# Patient Record
Sex: FEMALE | Race: WHITE | NOT HISPANIC OR LATINO
[De-identification: names, ages, dates, MRNs, and addresses within clinical notes are randomized per-mention and may not be internally consistent; named-entity substitution may affect disease eponyms.]

---

## 2023-02-23 PROBLEM — Z00.00 ENCOUNTER FOR PREVENTIVE HEALTH EXAMINATION: Status: ACTIVE | Noted: 2023-02-23

## 2023-02-27 ENCOUNTER — NON-APPOINTMENT (OUTPATIENT)
Age: 58
End: 2023-02-27

## 2023-02-28 ENCOUNTER — APPOINTMENT (OUTPATIENT)
Dept: PLASTIC SURGERY | Facility: CLINIC | Age: 58
End: 2023-02-28

## 2023-03-08 ENCOUNTER — TRANSCRIPTION ENCOUNTER (OUTPATIENT)
Age: 58
End: 2023-03-08

## 2023-03-08 VITALS
TEMPERATURE: 99 F | OXYGEN SATURATION: 97 % | WEIGHT: 237 LBS | HEART RATE: 69 BPM | HEIGHT: 65 IN | DIASTOLIC BLOOD PRESSURE: 84 MMHG | SYSTOLIC BLOOD PRESSURE: 134 MMHG | RESPIRATION RATE: 18 BRPM

## 2023-03-08 NOTE — PATIENT PROFILE ADULT - STATED REASON FOR ADMISSION
B/L mastectomy simple complete B/L nipple skin sparing mastectomy, sentinel node dissection deep, injection blue dye B/L nuclear medicine , navigation, faxitron

## 2023-03-09 ENCOUNTER — OUTPATIENT (OUTPATIENT)
Dept: OUTPATIENT SERVICES | Facility: HOSPITAL | Age: 58
LOS: 1 days | End: 2023-03-09
Payer: COMMERCIAL

## 2023-03-09 ENCOUNTER — APPOINTMENT (OUTPATIENT)
Dept: NUCLEAR MEDICINE | Facility: HOSPITAL | Age: 58
End: 2023-03-09

## 2023-03-09 ENCOUNTER — TRANSCRIPTION ENCOUNTER (OUTPATIENT)
Age: 58
End: 2023-03-09

## 2023-03-09 ENCOUNTER — INPATIENT (INPATIENT)
Facility: HOSPITAL | Age: 58
LOS: 1 days | Discharge: HOME HEALTH | DRG: 580 | End: 2023-03-11
Attending: SURGERY | Admitting: SURGERY
Payer: COMMERCIAL

## 2023-03-09 DIAGNOSIS — Z98.890 OTHER SPECIFIED POSTPROCEDURAL STATES: Chronic | ICD-10-CM

## 2023-03-09 DIAGNOSIS — C50.912 MALIGNANT NEOPLASM OF UNSPECIFIED SITE OF LEFT FEMALE BREAST: ICD-10-CM

## 2023-03-09 DIAGNOSIS — C79.2 SECONDARY MALIGNANT NEOPLASM OF SKIN: ICD-10-CM

## 2023-03-09 DIAGNOSIS — R42 DIZZINESS AND GIDDINESS: ICD-10-CM

## 2023-03-09 DIAGNOSIS — E66.01 MORBID (SEVERE) OBESITY DUE TO EXCESS CALORIES: ICD-10-CM

## 2023-03-09 DIAGNOSIS — C77.3 SECONDARY AND UNSPECIFIED MALIGNANT NEOPLASM OF AXILLA AND UPPER LIMB LYMPH NODES: ICD-10-CM

## 2023-03-09 DIAGNOSIS — C50.911 MALIGNANT NEOPLASM OF UNSPECIFIED SITE OF RIGHT FEMALE BREAST: ICD-10-CM

## 2023-03-09 DIAGNOSIS — Z90.49 ACQUIRED ABSENCE OF OTHER SPECIFIED PARTS OF DIGESTIVE TRACT: Chronic | ICD-10-CM

## 2023-03-09 PROCEDURE — 88307 TISSUE EXAM BY PATHOLOGIST: CPT | Mod: 26

## 2023-03-09 PROCEDURE — 78195 LYMPH SYSTEM IMAGING: CPT | Mod: 26

## 2023-03-09 PROCEDURE — 88341 IMHCHEM/IMCYTCHM EA ADD ANTB: CPT | Mod: 26,59

## 2023-03-09 PROCEDURE — 88360 TUMOR IMMUNOHISTOCHEM/MANUAL: CPT | Mod: 26

## 2023-03-09 PROCEDURE — 76098 X-RAY EXAM SURGICAL SPECIMEN: CPT | Mod: 26

## 2023-03-09 PROCEDURE — A9541: CPT

## 2023-03-09 PROCEDURE — 78195 LYMPH SYSTEM IMAGING: CPT

## 2023-03-09 PROCEDURE — 88342 IMHCHEM/IMCYTCHM 1ST ANTB: CPT | Mod: 26,59

## 2023-03-09 DEVICE — IMPLANTABLE DEVICE: Type: IMPLANTABLE DEVICE | Site: BILATERAL | Status: FUNCTIONAL

## 2023-03-09 DEVICE — CLIP APPLIER ETHICON LIGACLIP 11.5" MEDIUM: Type: IMPLANTABLE DEVICE | Site: BILATERAL | Status: FUNCTIONAL

## 2023-03-09 DEVICE — CLIP APPLIER ETHICON LIGACLIP 9 3/8" SMALL: Type: IMPLANTABLE DEVICE | Site: BILATERAL | Status: FUNCTIONAL

## 2023-03-09 RX ORDER — OXYCODONE HYDROCHLORIDE 5 MG/1
5 TABLET ORAL EVERY 6 HOURS
Refills: 0 | Status: DISCONTINUED | OUTPATIENT
Start: 2023-03-09 | End: 2023-03-11

## 2023-03-09 RX ORDER — OXYCODONE HYDROCHLORIDE 5 MG/1
10 TABLET ORAL EVERY 6 HOURS
Refills: 0 | Status: DISCONTINUED | OUTPATIENT
Start: 2023-03-09 | End: 2023-03-11

## 2023-03-09 RX ORDER — CEFAZOLIN SODIUM 1 G
1000 VIAL (EA) INJECTION EVERY 8 HOURS
Refills: 0 | Status: DISCONTINUED | OUTPATIENT
Start: 2023-03-09 | End: 2023-03-11

## 2023-03-09 RX ORDER — ONDANSETRON 8 MG/1
4 TABLET, FILM COATED ORAL EVERY 6 HOURS
Refills: 0 | Status: DISCONTINUED | OUTPATIENT
Start: 2023-03-09 | End: 2023-03-11

## 2023-03-09 RX ORDER — DIAZEPAM 5 MG
5 TABLET ORAL EVERY 6 HOURS
Refills: 0 | Status: DISCONTINUED | OUTPATIENT
Start: 2023-03-09 | End: 2023-03-09

## 2023-03-09 RX ORDER — SODIUM CHLORIDE 9 MG/ML
1000 INJECTION, SOLUTION INTRAVENOUS
Refills: 0 | Status: DISCONTINUED | OUTPATIENT
Start: 2023-03-09 | End: 2023-03-10

## 2023-03-09 RX ORDER — ACETAMINOPHEN 500 MG
1000 TABLET ORAL EVERY 6 HOURS
Refills: 0 | Status: DISCONTINUED | OUTPATIENT
Start: 2023-03-09 | End: 2023-03-11

## 2023-03-09 RX ORDER — HYDROMORPHONE HYDROCHLORIDE 2 MG/ML
0.25 INJECTION INTRAMUSCULAR; INTRAVENOUS; SUBCUTANEOUS
Refills: 0 | Status: DISCONTINUED | OUTPATIENT
Start: 2023-03-09 | End: 2023-03-09

## 2023-03-09 RX ORDER — DIAZEPAM 5 MG
5 TABLET ORAL EVERY 6 HOURS
Refills: 0 | Status: DISCONTINUED | OUTPATIENT
Start: 2023-03-09 | End: 2023-03-11

## 2023-03-09 RX ADMIN — HYDROMORPHONE HYDROCHLORIDE 0.25 MILLIGRAM(S): 2 INJECTION INTRAMUSCULAR; INTRAVENOUS; SUBCUTANEOUS at 20:15

## 2023-03-09 RX ADMIN — Medication 100 MILLIGRAM(S): at 22:56

## 2023-03-09 RX ADMIN — HYDROMORPHONE HYDROCHLORIDE 0.25 MILLIGRAM(S): 2 INJECTION INTRAMUSCULAR; INTRAVENOUS; SUBCUTANEOUS at 20:30

## 2023-03-09 RX ADMIN — HYDROMORPHONE HYDROCHLORIDE 0.25 MILLIGRAM(S): 2 INJECTION INTRAMUSCULAR; INTRAVENOUS; SUBCUTANEOUS at 18:41

## 2023-03-09 RX ADMIN — HYDROMORPHONE HYDROCHLORIDE 0.25 MILLIGRAM(S): 2 INJECTION INTRAMUSCULAR; INTRAVENOUS; SUBCUTANEOUS at 18:55

## 2023-03-09 NOTE — H&P ADULT - ASSESSMENT
Patient is a 58yo female with PMHx of vertigo, morbid obesity (BMI 39), b/l invasive intraductal carcinoma, and PSHx of a laparoscopic cholecystectomy (2022), who presents to [facility] for an elective b/l simple mastectomy with b/l sentinel lymph node dissection, and oncoplastic reconstruction with alloderm implants. Patient admitted post-operatively.    Plan    Admit to [facility] - Dr. Polo  Pain/Nausea control  Regular diet  L JPx2, R JPx1  SCD, holding [facility] labs Patient is a 58yo female with PMHx of vertigo, morbid obesity (BMI 39), b/l breast Ca, and PSHx of a laparoscopic cholecystectomy (2022), who presents to [facility] for an elective b/l simple mastectomy with b/l sentinel lymph node dissection, and  reconstruction with implants and alloderm placement.      Plan  Admit to [facility] - Dr. Polo  Pain/Nausea control  Regular diet  Ancef until discharge  L JPx2, R JPx1  SCD, holding [facility]  AM labs

## 2023-03-09 NOTE — BRIEF OPERATIVE NOTE - NSICDXBRIEFPREOP_GEN_ALL_CORE_FT
PRE-OP DIAGNOSIS:  Invasive ductal carcinoma of breast 09-Mar-2023 17:39:05  Castillo Flores   PRE-OP DIAGNOSIS:  Invasive lobular carcinoma of left breast, stage 2 09-Mar-2023 20:55:39 T2N0 ER+, AR+, Her2- Castillo Flores  Invasive ductal carcinoma of right breast, stage 2 09-Mar-2023 20:55:56 T2N0 ER+, AR+. Her2- Castillo Flores

## 2023-03-09 NOTE — PRE-ANESTHESIA EVALUATION ADULT - NSPROPOSEDPROCEDFT_GEN_ALL_CORE
bilateral mastectomy simple complete, SLND, bilateral breast reconstruction with implants
b/l mastectomy with reconstruction

## 2023-03-09 NOTE — PRE-ANESTHESIA EVALUATION ADULT - NSANTHPMHFT_GEN_ALL_CORE
had a cold 10 days ago, consistent cough had a cold 10 days ago, consistent cough  GERD - Increases when not eating and she is experiencing symptoms today.  (did not take meds last night or today)

## 2023-03-09 NOTE — BRIEF OPERATIVE NOTE - NSICDXBRIEFPOSTOP_GEN_ALL_CORE_FT
POST-OP DIAGNOSIS:  Invasive ductal carcinoma of breast 09-Mar-2023 17:39:19  Castillo Flores   POST-OP DIAGNOSIS:  Invasive ductal carcinoma of right breast, stage 2 09-Mar-2023 20:56:52  Castillo Flores  Invasive lobular carcinoma of left breast, stage 2 09-Mar-2023 20:57:04  Castillo Flores

## 2023-03-09 NOTE — H&P ADULT - NSHPPHYSICALEXAM_GEN_ALL_CORE
CONSTITUTIONAL: Well groomed, no apparent distress  EYES: PERRLA and symmetric, EOMI, No conjunctival or scleral injection, non-icteric  ENMT: Oral mucosa with moist membranes.   NECK: Supple, symmetric and without tracheal deviation  BREAST: equal b/l, palpable masses: R upper outer quadrant, L retroareolar lower inner   RESP: No respiratory distress, no use of accessory muscles  CV: No JVD; no peripheral edema  GI: Soft, NT, ND, no rebound, no guarding  SKIN: No rashes or ulcers noted; no subcutaneous nodules or induration palpable  NEURO: No apparent focal deficits   PSYCH: Appropriate insight/judgment; A+O x 3, mood and affect appropriate, recent/remote memory intact

## 2023-03-09 NOTE — BRIEF OPERATIVE NOTE - NSICDXBRIEFPROCEDURE_GEN_ALL_CORE_FT
PROCEDURES:  Total mastectomy with sentinel lymph node biopsy 09-Mar-2023 17:32:55  Castillo Flores

## 2023-03-09 NOTE — H&P ADULT - HISTORY OF PRESENT ILLNESS
Patient is a 56yo female with PMHx of vertigo, morbid obesity (BMI 39), b/l invasive intraductal carcinoma, and PSHx of a laparoscopic cholecystectomy (2022), who presents to [facility] for an elective b/l simple mastectomy with b/l sentinel lymph node dissection, and oncoplastic reconstruction with alloderm implants. Patient feels well with no active complaints. Patient is a 58yo female with PMHx of vertigo, morbid obesity (BMI 39), b/l invasive intraductal carcinoma, and PSHx of a laparoscopic cholecystectomy (2022), who presents to [facility] for an elective b/l simple mastectomy with b/l sentinel lymph node dissection, and  reconstruction with implants and alloderm placement. Patient feels well with no active complaints. Patient is a 58yo female with PMHx of vertigo, morbid obesity (BMI 39), b/l invasive intraductal carcinoma (T2N1M0/Stage IIB), and PSHx of a laparoscopic cholecystectomy (2022), who presents to [facility] for an elective b/l simple mastectomy with b/l sentinel lymph node dissection, and  reconstruction with implants and alloderm placement.  Patient feels well with no active complaints. Patient is a 56yo female with PMHx of vertigo, morbid obesity (BMI 39), b/l breast Ca, and PSHx of a laparoscopic cholecystectomy (2022), who presents to [facility] for an elective b/l simple mastectomy with b/l sentinel lymph node dissection, and  reconstruction with implants and alloderm placement.  Preoperative workup for b/l breast Ca was as follows: R sided IDC stage IIa (T2N0 ER+, PA+, Her2-), L sided ILC Stage IIa (T2N0 ER+, PA+, Her2-). She underwent preoperative risk stratification and was noted to be low risk for low//intermediate risk procedure.  Patient feels well with no active complaints.

## 2023-03-09 NOTE — BRIEF OPERATIVE NOTE - OPERATION/FINDINGS
Dye injected prior to incision. Skin prepped and draped with maximal sterile technique. Elliptical incision made around areola. Flaps raised superiorly to clavicle, medially to sternum, inferiorly to IMF, laterally to border of latissimus dorsi, and posteriorly to pectoralis major fascia using electrocautery and sharp dissection. Bleeding perforators clipped 2x on L, 1x on R. Dissection made to sentinel lymph nodes and 3x specimens collected b/l. Operative area flushed with sterile saline and hemostasis confirmed. Cavities packed with wet lap pads. Plastics continued OR for reconstruction. Dye injected prior to incision. Skin prepped and draped with maximal sterile technique. Elliptical incision made around areola. Flaps raised superiorly to clavicle, medially to sternum, inferiorly to IMF, laterally to border of latissimus dorsi, and posteriorly to pectoralis major fascia using electrocautery and sharp dissection. Multiple small perforaters encountered and cauterized. Large bleeding perforators clipped 2x on L, 1x on R.  Dissection made to sentinel lymph nodes and 3x specimens collected b/l. Operative area flushed with sterile saline and hemostasis confirmed. Cavities packed with wet lap pads. Plastics continued OR for reconstruction.

## 2023-03-09 NOTE — PRE-ANESTHESIA EVALUATION ADULT - NSANTHPEFT_GEN_ALL_CORE
General: AAOx3, NAD  Eyes: The sclera and conjunctiva normal, pupils equal in size.  ENT: The ears and nose were normal in appearance; oropharynx clear, moist mucus membranes.  Neck: The appearance of the neck was normal, with no gross masses or nodules.  Respiratory: Unlabored, no retractions. pos cough -  CTA b/l no w/r/r   Neurological: No focal deficit, moves all extremities.  Exercise Tolerance:  METS>4.

## 2023-03-10 ENCOUNTER — TRANSCRIPTION ENCOUNTER (OUTPATIENT)
Age: 58
End: 2023-03-10

## 2023-03-10 RX ORDER — ACETAMINOPHEN 500 MG
2 TABLET ORAL
Qty: 0 | Refills: 0 | DISCHARGE
Start: 2023-03-10

## 2023-03-10 RX ORDER — IPRATROPIUM/ALBUTEROL SULFATE 18-103MCG
3 AEROSOL WITH ADAPTER (GRAM) INHALATION EVERY 6 HOURS
Refills: 0 | Status: DISCONTINUED | OUTPATIENT
Start: 2023-03-10 | End: 2023-03-11

## 2023-03-10 RX ORDER — PANTOPRAZOLE SODIUM 20 MG/1
40 TABLET, DELAYED RELEASE ORAL
Refills: 0 | Status: DISCONTINUED | OUTPATIENT
Start: 2023-03-10 | End: 2023-03-11

## 2023-03-10 RX ADMIN — PANTOPRAZOLE SODIUM 40 MILLIGRAM(S): 20 TABLET, DELAYED RELEASE ORAL at 01:38

## 2023-03-10 RX ADMIN — OXYCODONE HYDROCHLORIDE 10 MILLIGRAM(S): 5 TABLET ORAL at 11:05

## 2023-03-10 RX ADMIN — OXYCODONE HYDROCHLORIDE 10 MILLIGRAM(S): 5 TABLET ORAL at 16:35

## 2023-03-10 RX ADMIN — OXYCODONE HYDROCHLORIDE 5 MILLIGRAM(S): 5 TABLET ORAL at 01:36

## 2023-03-10 RX ADMIN — Medication 100 MILLIGRAM(S): at 21:48

## 2023-03-10 RX ADMIN — OXYCODONE HYDROCHLORIDE 10 MILLIGRAM(S): 5 TABLET ORAL at 10:35

## 2023-03-10 RX ADMIN — Medication 1000 MILLIGRAM(S): at 06:19

## 2023-03-10 RX ADMIN — OXYCODONE HYDROCHLORIDE 5 MILLIGRAM(S): 5 TABLET ORAL at 00:36

## 2023-03-10 RX ADMIN — Medication 100 MILLIGRAM(S): at 06:11

## 2023-03-10 RX ADMIN — Medication 100 MILLIGRAM(S): at 15:21

## 2023-03-10 RX ADMIN — OXYCODONE HYDROCHLORIDE 10 MILLIGRAM(S): 5 TABLET ORAL at 17:05

## 2023-03-10 RX ADMIN — SODIUM CHLORIDE 140 MILLILITER(S): 9 INJECTION, SOLUTION INTRAVENOUS at 02:46

## 2023-03-10 NOTE — PROGRESS NOTE ADULT - SUBJECTIVE AND OBJECTIVE BOX
Procedure: b/l simple mastectomy with b/l SLN dissection and oncoplastic reconstruction with alloderm implants  Surgeon: Dr. Polo    S: Pt seen and examined at bedside. Patient is lying comfortably in bed with complaints of mild pain, right breast > left breast. She has only had a few bites and drinking some water, but has tolerated. Pain well controlled with current regimen. Patient denies fever, nausea, vomiting, chest pain, and shortness of breath. Patient is not yet passing gas or having bowel movements. Patient has not voided yet.     O:  T(C): --  T(F): --  HR: 77 (03-09-23 @ 21:00) (75 - 77)  BP: 133/72 (03-09-23 @ 21:00) (127/75 - 142/79)  RR: 15 (03-09-23 @ 21:00) (14 - 15)  SpO2: 96% (03-09-23 @ 21:00) (96% - 99%)  Wt(kg): --        Physical Exam  General: Patient is doing well and lying in bed comfortably  Constitutional: alert and oriented   Pulm: Nonlabored breathing, no respiratory distress  CV: Regular rate and rhythm, normal sinus rhythm  Breast: L - soft, nontender, incision c/d/i, no surrounding erythema, induration, drainage. R - soft, mildly tender to palpation near incisino, incision c/d/i, no surrounding erythema, induration, drainage.  Abd:  soft, nontender, nondistended. No rebound, no guarding.   Extremities: warm, well perfused, no edema  Drains: b/l SENTHIL ss    A/P: 57y Female s/p b/l simple mastectomy with b/l SLN dissection and oncoplastic reconstruction with alloderm implants  Reg Diet  pending TOV  Pain/nausea control prn  SCDs/OOBA/IS/holding [facility]  Dispo plan: Tele   Procedure: b/l simple mastectomy with b/l SLN dissection and oncoplastic reconstruction with alloderm implants  Surgeon: Dr. Polo    S: Pt seen and examined at bedside. Patient is lying comfortably in bed with complaints of mild pain, right breast > left breast. She has only had a few bites and drinking some water, but has tolerated. Pain well controlled with current regimen. Patient denies fever, nausea, vomiting, chest pain, and shortness of breath. Patient is not yet passing gas or having bowel movements. Patient has not voided yet.     O:  T(C): --  T(F): --  HR: 77 (03-09-23 @ 21:00) (75 - 77)  BP: 133/72 (03-09-23 @ 21:00) (127/75 - 142/79)  RR: 15 (03-09-23 @ 21:00) (14 - 15)  SpO2: 96% (03-09-23 @ 21:00) (96% - 99%)  Wt(kg): --        Physical Exam  General: Patient is doing well and lying in bed comfortably  Constitutional: alert and oriented   Pulm: Nonlabored breathing, no respiratory distress  CV: Regular rate and rhythm, normal sinus rhythm  Breast: L - soft, nontender, incision c/d/i, no surrounding erythema, induration, drainage. R - soft, mildly tender to palpation near incisino, incision c/d/i, no surrounding erythema, induration, drainage.  Abd:  soft, nontender, nondistended. No rebound, no guarding.   Extremities: warm, well perfused, no edema  Drains: SENTHIL x 3 all ss    A/P: 57y Female s/p b/l simple mastectomy with b/l SLN dissection and oncoplastic reconstruction with alloderm implants  Reg Diet  Ancef  pending TOV  Pain/nausea control prn  SCDs/OOBA/IS/holding [facility]  Dispo plan: Tele

## 2023-03-10 NOTE — DISCHARGE NOTE PROVIDER - HOSPITAL COURSE
Patient is a 58yo female with PMHx of vertigo, morbid obesity (BMI 39), b/l breast Ca, and PSHx of a laparoscopic cholecystectomy (2022), who presents to [facility] for an elective b/l simple mastectomy with b/l sentinel lymph node dissection, and  reconstruction with implants and alloderm placement.  Preoperative workup for b/l breast Ca was as follows: R sided IDC stage IIa (T2N0 ER+, SD+, Her2-), L sided ILC Stage IIa (T2N0 ER+, SD+, Her2-). She underwent preoperative risk stratification and was noted to be low risk for low//intermediate risk procedure.  Patient feels well with no active complaints. On 3/9 underwent b/l simple mastectomy w/b/l sentinel lymph node dissection and oncoplastic reconstruction with alloderm implaints. Tolerated procedure well with no immediate complicaitons. Postoperatively, patient experienced high levels of pain requiring prolonged hospital stay. Otherwise, patient tolerating diet, ambulating, voiding.    Incomplete 3/10 - please comment on pain control   Patient is a 56yo female with PMHx of vertigo, morbid obesity (BMI 39), b/l breast Ca, and PSHx of a laparoscopic cholecystectomy (2022), who presents to [facility] for an elective b/l simple mastectomy with b/l sentinel lymph node dissection, and  reconstruction with implants and alloderm placement.  Preoperative workup for b/l breast Ca was as follows: R sided IDC stage IIa (T2N0 ER+, OR+, Her2-), L sided ILC Stage IIa (T2N0 ER+, OR+, Her2-). She underwent preoperative risk stratification and was noted to be low risk for low//intermediate risk procedure.  Patient feels well with no active complaints. On 3/9 underwent b/l simple mastectomy w/b/l sentinel lymph node dissection and oncoplastic reconstruction with alloderm implaints. Tolerated procedure well with no immediate complicaitons. Postoperatively, patient experienced high levels of pain requiring prolonged hospital stay. Otherwise, patient tolerating diet, ambulating, voiding. Pain well controlled on 3/11 and patient was medically stable for discharge.

## 2023-03-10 NOTE — PROGRESS NOTE ADULT - SUBJECTIVE AND OBJECTIVE BOX
SUBJECTIVE:  Doing well.   No overnight events.     OBJECTIVE:     ** VITAL SIGNS / I&O's **    T(C): 37.2 (03-10-23 @ 05:15), Max: 37.2 (03-10-23 @ 05:15)  T(F): 98.9 (03-10-23 @ 05:15), Max: 98.9 (03-10-23 @ 05:15)  HR: 78 (03-10-23 @ 05:15) (69 - 87)  BP: 130/68 (03-10-23 @ 05:15) (110/64 - 148/72)  RR: 18 (03-10-23 @ 05:15) (14 - 23)  SpO2: 91% (03-10-23 @ 05:15) (91% - 99%)      09 Mar 2023 07:01  -  10 Mar 2023 07:00  --------------------------------------------------------  IN:    Lactated Ringers: 280 mL  Total IN: 280 mL    OUT:    Drain (mL): 25 mL    Drain (mL): 55 mL    Drain (mL): 60 mL    Drain (mL): 65 mL    Voided (mL): 575 mL  Total OUT: 780 mL    Total NET: -500 mL          ** PHYSICAL EXAM **     -- CONSTITUTIONAL: AOx3. NAD.   -- RIGHT BREAST: Mastectomy skin flap ecchymosis, stable. No collections. Drain(s) serosanguinous.  -- LEFT BREAST: Mastectomy skin flap ecchymosis, stable. No collections. Drain(s) serosanguinous.      ** LABS **                 CAPILLARY BLOOD GLUCOSE

## 2023-03-10 NOTE — DISCHARGE NOTE PROVIDER - NSDCCPCAREPLAN_GEN_ALL_CORE_FT
PRINCIPAL DISCHARGE DIAGNOSIS  Diagnosis: Status post surgery  Assessment and Plan of Treatment:

## 2023-03-10 NOTE — DISCHARGE NOTE PROVIDER - PROVIDER TOKENS
PROVIDER:[TOKEN:[[id number]],FOLLOWUP:[2 weeks]] PROVIDER:[TOKEN:[[id number]],FOLLOWUP:[2 weeks]],PROVIDER:[TOKEN:[[id number]]]

## 2023-03-10 NOTE — DISCHARGE NOTE PROVIDER - NSDCFUADDINST_GEN_ALL_CORE_FT
Please follow up with Dr. Polo in one week; you may call the office to make an appointment at your earliest convenience.    General Discharge Instructions:  Please resume all regular home medications unless specifically advised not to take a particular medication. Also, please take any new medications as prescribed.  Please get plenty of rest, continue to ambulate several times per day, and drink adequate amounts of fluids. Avoid lifting weights greater than 5-10 lbs until you follow-up with your surgeon, who will instruct you further regarding activity restrictions.  Avoid driving or operating heavy machinery while taking pain medications.  Please follow-up with your surgeon and Primary Care Provider (PCP) as advised.  *Please call your doctor if you have increased pain.    Warning Signs:  Please call your doctor if you experience the following:  *You experience new chest pain, pressure, squeezing or tightness.  *New or worsening cough, shortness of breath, or wheeze.  *If you are vomiting and cannot keep down fluids or your medications.  *You are getting dehydrated due to continued vomiting, diarrhea, or other reasons. Signs of dehydration include dry mouth, rapid heartbeat, or feeling dizzy or faint when standing.  *You see blood or dark/black material when you vomit or have a bowel movement.  *You experience burning when you urinate, have blood in your urine, or experience a discharge.  *Your pain is not improving within 8-12 hours or is not gone within 24 hours. Call or return immediately if your pain is getting worse, changes location, or moves to your chest or back.  *You have shaking chills, or fever greater than 101.5 degrees Fahrenheit or 38 degrees Celsius.  *Any change in your symptoms, or any new symptoms that concerns you.         Please follow up with Dr. Polo in one week; you may call the office to make an appointment at your earliest convenience.  Please follow up with Dr. Mckoy in one week; you may call the office to make an appointment at your earliest convenience.    OK to shower at home.    General Discharge Instructions:  Please resume all regular home medications unless specifically advised not to take a particular medication. Also, please take any new medications as prescribed.  Please get plenty of rest, continue to ambulate several times per day, and drink adequate amounts of fluids. Avoid lifting weights greater than 5-10 lbs until you follow-up with your surgeon, who will instruct you further regarding activity restrictions.  Avoid driving or operating heavy machinery while taking pain medications.  Please follow-up with your surgeon and Primary Care Provider (PCP) as advised.  *Please call your doctor if you have increased pain.    Warning Signs:  Please call your doctor if you experience the following:  *You experience new chest pain, pressure, squeezing or tightness.  *New or worsening cough, shortness of breath, or wheeze.  *If you are vomiting and cannot keep down fluids or your medications.  *You are getting dehydrated due to continued vomiting, diarrhea, or other reasons. Signs of dehydration include dry mouth, rapid heartbeat, or feeling dizzy or faint when standing.  *You see blood or dark/black material when you vomit or have a bowel movement.  *You experience burning when you urinate, have blood in your urine, or experience a discharge.  *Your pain is not improving within 8-12 hours or is not gone within 24 hours. Call or return immediately if your pain is getting worse, changes location, or moves to your chest or back.  *You have shaking chills, or fever greater than 101.5 degrees Fahrenheit or 38 degrees Celsius.  *Any change in your symptoms, or any new symptoms that concerns you.

## 2023-03-10 NOTE — DISCHARGE NOTE PROVIDER - NSDCCPTREATMENT_GEN_ALL_CORE_FT
PRINCIPAL PROCEDURE  Procedure: Total mastectomy with sentinel lymph node biopsy  Findings and Treatment:

## 2023-03-10 NOTE — DISCHARGE NOTE PROVIDER - CARE PROVIDER_API CALL
Price Polo (DO)  Surgery  [address], [place] [address]  Phone: [phone]  Fax: [phone]  Follow Up Time: 2 weeks   Price Polo (DO)  Surgery  [address], [place] [address]  Phone: [phone]  Fax: [phone]  Follow Up Time: 2 weeks    Alissa Mckoy)  Plastic Surgery  [address], [address]  [place] [address]  Phone: [phone]  Fax: [phone]  Follow Up Time:

## 2023-03-10 NOTE — PROGRESS NOTE ADULT - SUBJECTIVE AND OBJECTIVE BOX
Patient had some pain overnight but feels better today. She is eating and drinking but has not yet walked. She requiring supplemental oxygen overnight but it is no longer required.    AVSS on room air (sat 95%)  drains overnight R 25/65 L 60/55 - drains thin serosanguinous    Bilateral breasts soft, no hematoma appreciated    Patient doing well overall POD#1 s/p bilateral mastectomy, sentinel LB biopsy and immediate breast reconstruction with implants. Continue present management. I encouraged the patient to ambulate with assistance. Likely ok for discharge tomorrow.

## 2023-03-10 NOTE — PROGRESS NOTE ADULT - SUBJECTIVE AND OBJECTIVE BOX
STATUS POST:  Total mastectomy with sentinel lymph node biopsy        POST OPERATIVE DAY #: 1    SUBJECTIVE:   Patient seen and examined on am rounds. No new complaints. -n-v-cp-sob.    Vital Signs Last 24 Hrs  T(C): 37.4 (10 Mar 2023 09:10), Max: 37.4 (10 Mar 2023 09:10)  T(F): 99.4 (10 Mar 2023 09:10), Max: 99.4 (10 Mar 2023 09:10)  HR: 91 (10 Mar 2023 09:10) (69 - 91)  BP: 120/56 (10 Mar 2023 09:10) (110/64 - 148/72)  BP(mean): 97 (09 Mar 2023 21:00) (82 - 105)  RR: 18 (10 Mar 2023 09:10) (14 - 23)  SpO2: 91% (10 Mar 2023 05:15) (91% - 99%)    Parameters below as of 10 Mar 2023 05:15  Patient On (Oxygen Delivery Method): room air        I&O's Summary    09 Mar 2023 07:01  -  10 Mar 2023 07:00  --------------------------------------------------------  IN: 280 mL / OUT: 780 mL / NET: -500 mL        Physical Exam:  General Appearance: Appears well, NAD  Pulmonary: Nonlabored breathing, no respiratory distress  Cardiovascular: NSR  Abdomen: Soft, nondistended, nontender.  Breasts: no hematoma. JPx4 ss.  Extremities: WWP, SCD's in place     LABS:

## 2023-03-11 ENCOUNTER — TRANSCRIPTION ENCOUNTER (OUTPATIENT)
Age: 58
End: 2023-03-11

## 2023-03-11 VITALS
RESPIRATION RATE: 18 BRPM | DIASTOLIC BLOOD PRESSURE: 66 MMHG | OXYGEN SATURATION: 95 % | HEART RATE: 88 BPM | TEMPERATURE: 99 F | SYSTOLIC BLOOD PRESSURE: 132 MMHG

## 2023-03-11 PROCEDURE — 76098 X-RAY EXAM SURGICAL SPECIMEN: CPT

## 2023-03-11 PROCEDURE — 88341 IMHCHEM/IMCYTCHM EA ADD ANTB: CPT

## 2023-03-11 PROCEDURE — 88307 TISSUE EXAM BY PATHOLOGIST: CPT

## 2023-03-11 PROCEDURE — 88360 TUMOR IMMUNOHISTOCHEM/MANUAL: CPT

## 2023-03-11 PROCEDURE — C1889: CPT

## 2023-03-11 PROCEDURE — C1789: CPT

## 2023-03-11 RX ADMIN — Medication 1000 MILLIGRAM(S): at 06:12

## 2023-03-11 RX ADMIN — OXYCODONE HYDROCHLORIDE 10 MILLIGRAM(S): 5 TABLET ORAL at 09:10

## 2023-03-11 RX ADMIN — PANTOPRAZOLE SODIUM 40 MILLIGRAM(S): 20 TABLET, DELAYED RELEASE ORAL at 05:06

## 2023-03-11 RX ADMIN — OXYCODONE HYDROCHLORIDE 10 MILLIGRAM(S): 5 TABLET ORAL at 09:40

## 2023-03-11 RX ADMIN — OXYCODONE HYDROCHLORIDE 10 MILLIGRAM(S): 5 TABLET ORAL at 16:14

## 2023-03-11 RX ADMIN — OXYCODONE HYDROCHLORIDE 10 MILLIGRAM(S): 5 TABLET ORAL at 16:44

## 2023-03-11 RX ADMIN — OXYCODONE HYDROCHLORIDE 10 MILLIGRAM(S): 5 TABLET ORAL at 00:21

## 2023-03-11 RX ADMIN — OXYCODONE HYDROCHLORIDE 10 MILLIGRAM(S): 5 TABLET ORAL at 01:21

## 2023-03-11 RX ADMIN — Medication 1000 MILLIGRAM(S): at 05:12

## 2023-03-11 RX ADMIN — Medication 100 MILLIGRAM(S): at 05:06

## 2023-03-11 NOTE — PROGRESS NOTE ADULT - ASSESSMENT
Patient is a 56yo female with PMHx of vertigo, morbid obesity (BMI 39), b/l breast Ca, and PSHx of a laparoscopic cholecystectomy (2022), who is now s/p an elective b/l simple mastectomy with b/l sentinel lymph node dissection, and reconstruction with implants and alloderm placement    Plan:  Reg diet  Pain/Nausea control prn  L JPx2, R JPx2  SCD/OOBA/IS/holding [facility]  Ancef until D/C  likely ready for dc home today   
Patient is a 58yo female with PMHx of vertigo, morbid obesity (BMI 39), b/l breast Ca, and PSHx of a laparoscopic cholecystectomy (2022), who is now s/p an elective b/l simple mastectomy with b/l sentinel lymph node dissection, and reconstruction with implants and alloderm placement    Plan  Reg diet  Pain/Nausea control prn  L JPx2, R JPx2  SCD/OOBA/IS/holding [facility]  dispo: home
A/P: 57y female s/p bilateral mastectomy with direct to implant reconstruction, doing well    Patient likely to be discharged today per primary team   patient should ambulate   continue IV abx while in house  patient has prescriptions from office for d/c home     
Patient is a 56yo female with PMHx of vertigo, morbid obesity (BMI 39), b/l breast Ca, and PSHx of a laparoscopic cholecystectomy (2022), who is now s/p an elective b/l simple mastectomy with b/l sentinel lymph node dissection, and reconstruction with implants and alloderm placement    Plan  Reg diet  Pain/Nausea control prn  L JPx2, R JPx2  SCD/OOBA/IS/holding [facility]  Ancef until D/C

## 2023-03-11 NOTE — PROGRESS NOTE ADULT - SUBJECTIVE AND OBJECTIVE BOX
STATUS POST:  Total mastectomy with sentinel lymph node biopsy        POST OPERATIVE DAY #: 2    SUBJECTIVE:   Patient seen and examined on am rounds. No new complaints. -n-v-cp-sob.      T(C): 36.9 (03-11-23 @ 05:00), Max: 37.4 (03-10-23 @ 09:10)  T(F): 98.5 (03-11-23 @ 05:00), Max: 99.4 (03-10-23 @ 09:10)  HR: 92 (03-11-23 @ 05:00) (78 - 92)  BP: 124/64 (03-11-23 @ 05:00) (114/60 - 135/61)  RR: 16 (03-11-23 @ 05:00) (16 - 18)  SpO2: 93% (03-11-23 @ 05:00) (92% - 98%)      09 Mar 2023 07:01  -  10 Mar 2023 07:00  --------------------------------------------------------  IN:    Lactated Ringers: 280 mL  Total IN: 280 mL    OUT:    Drain (mL): 25 mL    Drain (mL): 55 mL    Drain (mL): 60 mL    Drain (mL): 65 mL    Voided (mL): 575 mL  Total OUT: 780 mL    Total NET: -500 mL      10 Mar 2023 07:01  -  11 Mar 2023 06:58  --------------------------------------------------------  IN:    Lactated Ringers: 1120 mL  Total IN: 1120 mL    OUT:    Drain (mL): 20 mL    Drain (mL): 70 mL    Drain (mL): 80 mL    Drain (mL): 160 mL    Voided (mL): 1800 mL  Total OUT: 2130 mL    Total NET: -1010 mL            Physical Exam:  General Appearance: Appears well, NAD  Pulmonary: Nonlabored breathing, no respiratory distress  Breasts: no collections, soft. JPx4 ss.  Extremities: WWP, SCD's in place

## 2023-03-11 NOTE — PROGRESS NOTE ADULT - REASON FOR ADMISSION
s/p b/l mastectomy
s/p b/l mastectomy and bilateral immediate implant reconstruction
s/p b/l mastectomy
s/p b/l mastectomy

## 2023-03-11 NOTE — DISCHARGE NOTE NURSING/CASE MANAGEMENT/SOCIAL WORK - PATIENT PORTAL LINK FT
You can access the FollowMyHealth Patient Portal offered by [facility] by registering at the following website: [email or web address]. By joining [employer]’s FollowMyHealth portal, you will also be able to view your health information using other applications (apps) compatible with our system.

## 2023-03-11 NOTE — PROGRESS NOTE ADULT - SUBJECTIVE AND OBJECTIVE BOX
STATUS POST:  Total mastectomy with sentinel lymph node biopsy    Total mastectomy with sentinel lymph node biopsy        POST OPERATIVE DAY #:     SUBJECTIVE:   Patient seen and examined on am rounds. Pain well controlled by medication. OOB. No new complaints. -n-v-cp-sob.    Vital Signs Last 24 Hrs  T(C): 36.9 (11 Mar 2023 05:00), Max: 37.3 (10 Mar 2023 20:48)  T(F): 98.5 (11 Mar 2023 05:00), Max: 99.1 (10 Mar 2023 20:48)  HR: 92 (11 Mar 2023 05:00) (78 - 92)  BP: 124/64 (11 Mar 2023 05:00) (114/60 - 135/61)  BP(mean): --  RR: 16 (11 Mar 2023 05:00) (16 - 18)  SpO2: 93% (11 Mar 2023 05:00) (92% - 98%)    Parameters below as of 11 Mar 2023 05:00  Patient On (Oxygen Delivery Method): room air        I&O's Summary    10 Mar 2023 07:01  -  11 Mar 2023 07:00  --------------------------------------------------------  IN: 1120 mL / OUT: 2130 mL / NET: -1010 mL        Physical Exam:  General Appearance: Appears well, NAD  Pulmonary: Nonlabored breathing, no respiratory distress  Cardiovascular: NSR  Abdomen: Soft, nondistended, nontender.  Breasts: no hematoma. JPx4 ss.  Extremities: WWP, SCD's in place     LABS:

## 2023-03-11 NOTE — DISCHARGE NOTE NURSING/CASE MANAGEMENT/SOCIAL WORK - NSDCPEFALRISK_GEN_ALL_CORE
Call your  Doctor  And  OB/GYNE  Doctor today for  appointment   For information on Fall & Injury Prevention, visit: [email or web address] OR  [email or web address] OR  https://www.cdc.gov/steadi/patient.html

## 2023-03-15 ENCOUNTER — TRANSCRIPTION ENCOUNTER (OUTPATIENT)
Age: 58
End: 2023-03-15

## 2023-03-15 LAB — SURGICAL PATHOLOGY STUDY: SIGNIFICANT CHANGE UP

## 2023-03-31 ENCOUNTER — TRANSCRIPTION ENCOUNTER (OUTPATIENT)
Age: 58
End: 2023-03-31

## 2023-10-09 PROBLEM — Z87.898 PERSONAL HISTORY OF OTHER SPECIFIED CONDITIONS: Chronic | Status: ACTIVE | Noted: 2023-03-08

## 2023-10-09 PROBLEM — C50.919 MALIGNANT NEOPLASM OF UNSPECIFIED SITE OF UNSPECIFIED FEMALE BREAST: Chronic | Status: ACTIVE | Noted: 2023-03-08

## 2023-10-11 ENCOUNTER — TRANSCRIPTION ENCOUNTER (OUTPATIENT)
Age: 58
End: 2023-10-11

## 2023-10-11 VITALS
TEMPERATURE: 98 F | HEIGHT: 65 IN | HEART RATE: 69 BPM | OXYGEN SATURATION: 96 % | SYSTOLIC BLOOD PRESSURE: 128 MMHG | RESPIRATION RATE: 16 BRPM | WEIGHT: 232.37 LBS | DIASTOLIC BLOOD PRESSURE: 78 MMHG

## 2023-10-11 NOTE — ASU PATIENT PROFILE, ADULT - FALL HARM RISK - HARM RISK INTERVENTIONS

## 2023-10-11 NOTE — ASU PATIENT PROFILE, ADULT - NSICDXPASTSURGICALHX_GEN_ALL_CORE_FT
PAST SURGICAL HISTORY:  H/O bilateral mastectomy march 2023    H/O breast biopsy     History of cholecystectomy      PAST SURGICAL HISTORY:  H/O bilateral mastectomy march 2023 implants    H/O bilateral oophorectomy 8/2023    H/O breast biopsy     H/O left nephrectomy 8/2023    History of cholecystectomy

## 2023-10-11 NOTE — ASU PATIENT PROFILE, ADULT - NSICDXPASTMEDICALHX_GEN_ALL_CORE_FT
PAST MEDICAL HISTORY:  Breast cancer     History of vertigo     Left renal mass     Pulmonary embolism      PAST MEDICAL HISTORY:  Breast cancer     History of vertigo     Left renal mass     Pulmonary embolism 2023

## 2023-10-12 ENCOUNTER — TRANSCRIPTION ENCOUNTER (OUTPATIENT)
Age: 58
End: 2023-10-12

## 2023-10-12 ENCOUNTER — OUTPATIENT (OUTPATIENT)
Dept: INPATIENT UNIT | Facility: HOSPITAL | Age: 58
LOS: 1 days | Discharge: HOME | End: 2023-10-12
Payer: COMMERCIAL

## 2023-10-12 VITALS
DIASTOLIC BLOOD PRESSURE: 83 MMHG | TEMPERATURE: 97 F | HEART RATE: 54 BPM | OXYGEN SATURATION: 100 % | RESPIRATION RATE: 22 BRPM | SYSTOLIC BLOOD PRESSURE: 140 MMHG

## 2023-10-12 DIAGNOSIS — Z90.13 ACQUIRED ABSENCE OF BILATERAL BREASTS AND NIPPLES: Chronic | ICD-10-CM

## 2023-10-12 DIAGNOSIS — Z90.722 ACQUIRED ABSENCE OF OVARIES, BILATERAL: Chronic | ICD-10-CM

## 2023-10-12 DIAGNOSIS — Z90.49 ACQUIRED ABSENCE OF OTHER SPECIFIED PARTS OF DIGESTIVE TRACT: Chronic | ICD-10-CM

## 2023-10-12 DIAGNOSIS — Z98.890 OTHER SPECIFIED POSTPROCEDURAL STATES: Chronic | ICD-10-CM

## 2023-10-12 DIAGNOSIS — Z90.5 ACQUIRED ABSENCE OF KIDNEY: Chronic | ICD-10-CM

## 2023-10-12 PROCEDURE — 88307 TISSUE EXAM BY PATHOLOGIST: CPT

## 2023-10-12 PROCEDURE — 19301 PARTIAL MASTECTOMY: CPT | Mod: LT

## 2023-10-12 PROCEDURE — 88305 TISSUE EXAM BY PATHOLOGIST: CPT

## 2023-10-12 PROCEDURE — 19380 REVJ RECONSTRUCTED BREAST: CPT | Mod: LT

## 2023-10-12 PROCEDURE — C1789: CPT

## 2023-10-12 PROCEDURE — 88307 TISSUE EXAM BY PATHOLOGIST: CPT | Mod: 26

## 2023-10-12 PROCEDURE — 88305 TISSUE EXAM BY PATHOLOGIST: CPT | Mod: 26

## 2023-10-12 DEVICE — IMPLANTABLE DEVICE: Type: IMPLANTABLE DEVICE | Status: FUNCTIONAL

## 2023-10-12 RX ORDER — OMEPRAZOLE 10 MG/1
1 CAPSULE, DELAYED RELEASE ORAL
Refills: 0 | DISCHARGE

## 2023-10-12 RX ORDER — HYDROMORPHONE HYDROCHLORIDE 2 MG/ML
0.5 INJECTION INTRAMUSCULAR; INTRAVENOUS; SUBCUTANEOUS
Refills: 0 | Status: DISCONTINUED | OUTPATIENT
Start: 2023-10-12 | End: 2023-10-12

## 2023-10-12 RX ORDER — APIXABAN 2.5 MG/1
1 TABLET, FILM COATED ORAL
Refills: 0 | DISCHARGE

## 2023-10-12 RX ORDER — ANASTROZOLE 1 MG/1
1 TABLET ORAL
Refills: 0 | DISCHARGE

## 2023-10-12 NOTE — BRIEF OPERATIVE NOTE - OPERATION/FINDINGS
Elliptical incision made over prior left medial incision. Skin and capsule removed and sent for specimen. Old left breast implant removed and breast irrigated with betadine and triple antibiotic. New left breast implant placed. Capsule closed with figure of 8 stitch with PDS. Deep space closed with interrupted PDS. Deep dermal layer approximated with running quill. Skin closed with running subcuticular stitch with quill. Dermabond.

## 2023-10-12 NOTE — ASU DISCHARGE PLAN (ADULT/PEDIATRIC) - NS MD DC FALL RISK RISK
For information on Fall & Injury Prevention, visit: [email or web address] OR  [email or web address] OR  https://www.cdc.gov/steadi/patient.html

## 2023-10-12 NOTE — BRIEF OPERATIVE NOTE - NSICDXBRIEFPROCEDURE_GEN_ALL_CORE_FT
PROCEDURES:  Left breast lumpectomy 12-Oct-2023 09:50:28  Man Durán  Removal of tissue expander from left breast with insertion of implant 12-Oct-2023 09:50:56  Man Durán

## 2023-10-12 NOTE — ASU DISCHARGE PLAN (ADULT/PEDIATRIC) - PROVIDER TOKENS
PROVIDER:[TOKEN:[[id number]],FOLLOWUP:[1 week]],PROVIDER:[TOKEN:[[id number]],FOLLOWUP:[1 week]]

## 2023-10-12 NOTE — DISCHARGE NOTE NURSING/CASE MANAGEMENT/SOCIAL WORK - NSDCPEFALRISK_GEN_ALL_CORE
For information on Fall & Injury Prevention, visit: [email or web address] OR  [email or web address] OR  https://www.cdc.gov/steadi/patient.html

## 2023-10-12 NOTE — ASU DISCHARGE PLAN (ADULT/PEDIATRIC) - ASU DC SPECIAL INSTRUCTIONSFT
You may remove the surgical bra in 2 days, then wear as needed for comfort. You may take Tylenol for pain every 6 hours. Do not exceed 4g of Tylenol in 1 day. Please resume your Eliquis in 1 week.

## 2023-10-12 NOTE — DISCHARGE NOTE NURSING/CASE MANAGEMENT/SOCIAL WORK - PATIENT PORTAL LINK FT
You can access the FollowMyHealth Patient Portal offered by [facility] by registering at the following website: [email or web address]. By joining [employer]’s FollowMyHealth portal, you will also be able to view your health information using other applications (apps) compatible with our system.

## 2023-10-12 NOTE — ASU DISCHARGE PLAN (ADULT/PEDIATRIC) - CARE PROVIDER_API CALL
Alissa Mckoy  Plastic Surgery  [address], [address]  [place] [address]  Phone: [phone]  Fax: [phone]  Follow Up Time: 1 week    Price Polo  Surgery  [address], [place] [address]  Phone: [phone]  Fax: [phone]  Follow Up Time: 1 week

## 2023-10-17 LAB
SURGICAL PATHOLOGY STUDY: SIGNIFICANT CHANGE UP
SURGICAL PATHOLOGY STUDY: SIGNIFICANT CHANGE UP

## (undated) DEVICE — FUNNEL STRL 6 IND PK

## (undated) DEVICE — ONETRAC LIGHTED RETRACTOR 135 X 30MM DISP

## (undated) DEVICE — PREP BETADINE SPONGE STICKS

## (undated) DEVICE — GOWN ROYAL SILK XL

## (undated) DEVICE — MARKING PEN W RULER

## (undated) DEVICE — SYR LUER LOK 10CC

## (undated) DEVICE — PACK BREAST RECONSTRUCTION

## (undated) DEVICE — DRSG GAUZE FLUFF 1PLY 18X30"

## (undated) DEVICE — DRAPE SPLIT SHEET 77" X 120"

## (undated) DEVICE — VENODYNE/SCD SLEEVE CALF MEDIUM

## (undated) DEVICE — SUT PDS II 2-0 27" CT-1

## (undated) DEVICE — ELCTR BOVIE TIP BLADE INSULATED 2.75" EDGE

## (undated) DEVICE — DRSG MASTISOL

## (undated) DEVICE — SUT VICRYL 2-0 27" SH UNDYED

## (undated) DEVICE — DRAPE PROBE COVER 5" X 96"

## (undated) DEVICE — PACK PROCEDURE SPY ELITE

## (undated) DEVICE — SLV COMPRESSION KNEE MED

## (undated) DEVICE — ELCTR BOVIE PENCIL SMOKE EVACUATION

## (undated) DEVICE — GLV 8 PROTEXIS (WHITE)

## (undated) DEVICE — DRAPE TOP SHEET 53" X 101"

## (undated) DEVICE — SUT SILK 2-0 18" PS

## (undated) DEVICE — DRSG STOCKINETTE TUBULAR COTTON 2PLY 6X60"

## (undated) DEVICE — ELCTR STRYKER NEPTUNE SMOKE EVACUATION PENCIL (GREEN)

## (undated) DEVICE — SUT MONOCRYL 4-0 18" PS-2

## (undated) DEVICE — SUT VICRYL 2-0 27" SH

## (undated) DEVICE — PACK PROC UNIV MAYO STAND 29118

## (undated) DEVICE — SUT QUILL MONODERM 3-0 30CM 19MM

## (undated) DEVICE — PREP CHLORAPREP HI-LITE ORANGE 26ML

## (undated) DEVICE — SYR LUER LOK 5CC

## (undated) DEVICE — STAPLER SKIN PROXIMATE

## (undated) DEVICE — SUT VICRYL 2-0 27" CT-1

## (undated) DEVICE — SUT MONOCRYL 3-0 18" PS-1

## (undated) DEVICE — DRAPE IOBAN 23" X 23"

## (undated) DEVICE — POSITIONER FOAM EGG CRATE ULNAR 2PCS (PINK)

## (undated) DEVICE — SUT SILK 2-0 30" PSL

## (undated) DEVICE — MEDTRONIC RADIALUX LIGHTED RETRACTOR DISP

## (undated) DEVICE — DRAPE CAMERA VIDEO 7"X96"

## (undated) DEVICE — DRSG SURGICAL BRA LG 38-40

## (undated) DEVICE — DRAPE TOWEL BLUE 17" X 24"